# Patient Record
Sex: FEMALE | Race: WHITE | URBAN - METROPOLITAN AREA
[De-identification: names, ages, dates, MRNs, and addresses within clinical notes are randomized per-mention and may not be internally consistent; named-entity substitution may affect disease eponyms.]

---

## 2023-07-03 ENCOUNTER — HOSPITAL ENCOUNTER (EMERGENCY)
Facility: HOSPITAL | Age: 68
Discharge: HOME | End: 2023-07-03
Attending: EMERGENCY MEDICINE
Payer: COMMERCIAL

## 2023-07-03 ENCOUNTER — APPOINTMENT (OUTPATIENT)
Dept: RADIOLOGY | Facility: HOSPITAL | Age: 68
End: 2023-07-03
Payer: COMMERCIAL

## 2023-07-03 VITALS
SYSTOLIC BLOOD PRESSURE: 119 MMHG | TEMPERATURE: 97.5 F | HEART RATE: 79 BPM | RESPIRATION RATE: 16 BRPM | OXYGEN SATURATION: 97 % | DIASTOLIC BLOOD PRESSURE: 71 MMHG

## 2023-07-03 DIAGNOSIS — M54.9 BACK PAIN: ICD-10-CM

## 2023-07-03 DIAGNOSIS — Z87.39 HISTORY OF DEGENERATIVE DISC DISEASE: ICD-10-CM

## 2023-07-03 DIAGNOSIS — R20.2 BILATERAL LEG PARESTHESIA: ICD-10-CM

## 2023-07-03 DIAGNOSIS — R00.2 PALPITATIONS: Primary | ICD-10-CM

## 2023-07-03 LAB
ALBUMIN SERPL BCP-MCNC: 4.3 G/DL (ref 3.5–5)
ALP SERPL-CCNC: 54 U/L (ref 34–104)
ALT SERPL W P-5'-P-CCNC: 17 U/L (ref 7–52)
ANION GAP SERPL CALCULATED.3IONS-SCNC: 7 MMOL/L
AST SERPL W P-5'-P-CCNC: 18 U/L (ref 13–39)
ATRIAL RATE: 72 BPM
BASOPHILS # BLD AUTO: 0.04 THOUSANDS/ÂΜL (ref 0–0.1)
BASOPHILS NFR BLD AUTO: 1 % (ref 0–1)
BILIRUB SERPL-MCNC: 1.52 MG/DL (ref 0.2–1)
BUN SERPL-MCNC: 21 MG/DL (ref 5–25)
CALCIUM SERPL-MCNC: 9.6 MG/DL (ref 8.4–10.2)
CARDIAC TROPONIN I PNL SERPL HS: <2 NG/L
CHLORIDE SERPL-SCNC: 103 MMOL/L (ref 96–108)
CO2 SERPL-SCNC: 26 MMOL/L (ref 21–32)
CREAT SERPL-MCNC: 0.79 MG/DL (ref 0.6–1.3)
EOSINOPHIL # BLD AUTO: 0.09 THOUSAND/ÂΜL (ref 0–0.61)
EOSINOPHIL NFR BLD AUTO: 1 % (ref 0–6)
ERYTHROCYTE [DISTWIDTH] IN BLOOD BY AUTOMATED COUNT: 12.4 % (ref 11.6–15.1)
GFR SERPL CREATININE-BSD FRML MDRD: 78 ML/MIN/1.73SQ M
GLUCOSE SERPL-MCNC: 84 MG/DL (ref 65–140)
HCT VFR BLD AUTO: 43.3 % (ref 34.8–46.1)
HGB BLD-MCNC: 14.3 G/DL (ref 11.5–15.4)
IMM GRANULOCYTES # BLD AUTO: 0.02 THOUSAND/UL (ref 0–0.2)
IMM GRANULOCYTES NFR BLD AUTO: 0 % (ref 0–2)
LYMPHOCYTES # BLD AUTO: 2.57 THOUSANDS/ÂΜL (ref 0.6–4.47)
LYMPHOCYTES NFR BLD AUTO: 37 % (ref 14–44)
MCH RBC QN AUTO: 31.4 PG (ref 26.8–34.3)
MCHC RBC AUTO-ENTMCNC: 33 G/DL (ref 31.4–37.4)
MCV RBC AUTO: 95 FL (ref 82–98)
MONOCYTES # BLD AUTO: 0.63 THOUSAND/ÂΜL (ref 0.17–1.22)
MONOCYTES NFR BLD AUTO: 9 % (ref 4–12)
NEUTROPHILS # BLD AUTO: 3.53 THOUSANDS/ÂΜL (ref 1.85–7.62)
NEUTS SEG NFR BLD AUTO: 52 % (ref 43–75)
NRBC BLD AUTO-RTO: 0 /100 WBCS
P AXIS: 8 DEGREES
PLATELET # BLD AUTO: 210 THOUSANDS/UL (ref 149–390)
PMV BLD AUTO: 10.6 FL (ref 8.9–12.7)
POTASSIUM SERPL-SCNC: 4.4 MMOL/L (ref 3.5–5.3)
PR INTERVAL: 124 MS
PROT SERPL-MCNC: 7.6 G/DL (ref 6.4–8.4)
QRS AXIS: 60 DEGREES
QRSD INTERVAL: 78 MS
QT INTERVAL: 380 MS
QTC INTERVAL: 416 MS
RBC # BLD AUTO: 4.56 MILLION/UL (ref 3.81–5.12)
SODIUM SERPL-SCNC: 136 MMOL/L (ref 135–147)
T WAVE AXIS: 88 DEGREES
VENTRICULAR RATE: 72 BPM
WBC # BLD AUTO: 6.88 THOUSAND/UL (ref 4.31–10.16)

## 2023-07-03 PROCEDURE — 93005 ELECTROCARDIOGRAM TRACING: CPT

## 2023-07-03 PROCEDURE — 84484 ASSAY OF TROPONIN QUANT: CPT | Performed by: EMERGENCY MEDICINE

## 2023-07-03 PROCEDURE — 80053 COMPREHEN METABOLIC PANEL: CPT | Performed by: EMERGENCY MEDICINE

## 2023-07-03 PROCEDURE — 71046 X-RAY EXAM CHEST 2 VIEWS: CPT

## 2023-07-03 PROCEDURE — 93010 ELECTROCARDIOGRAM REPORT: CPT | Performed by: INTERNAL MEDICINE

## 2023-07-03 PROCEDURE — 36415 COLL VENOUS BLD VENIPUNCTURE: CPT

## 2023-07-03 PROCEDURE — 85025 COMPLETE CBC W/AUTO DIFF WBC: CPT | Performed by: EMERGENCY MEDICINE

## 2023-07-03 PROCEDURE — 99285 EMERGENCY DEPT VISIT HI MDM: CPT

## 2023-07-03 NOTE — Clinical Note
Luis Antonio Li was seen and treated in our emergency department on 7/3/2023. Diagnosis:     Catherine German  . She may return on this date: If you have any questions or concerns, please don't hesitate to call.       Victoriano Nur, DO    ______________________________           _______________          _______________  Hospital Representative                              Date                                Time

## 2023-07-03 NOTE — ED PROVIDER NOTES
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 HPI     77 y/o F presents with sensation of palpitations which she states is not new and has been going on for approx 2 years intermittently and states that she has not had a significant amount of time to follow up on her own health. She recently went to a [facility] for a life line screening on 6/15 and was given a report with information for her screening w/ notes of mild carotid atherosclerosis, no detection of AAA, or atrial fibrillation, no otherwise significant PAD, low probability of osteoporosis and Moderate BMI risk w/ BMI 26. She had incidental findings of "possible ST segment changes or Q waves" with an ECG read out. She presents today for further evaluation. She does not currently have PCP. Prior had established care w/ Dr. Azar January but without recent f/u. She is currently asymptomatic. No reports of CP today. No current palpitations, syncope or presyncopal events. She does state that she has had a hx of DJD and does intermittently get LBP as well as B/L LE paresthesias intermittently. Prior to Admission Medications   Prescriptions Last Dose Informant Patient Reported? Taking? Coenzyme Q10 (CO Q 10 PO)   Yes Yes   Sig: Take by mouth   Docosahexaenoic Acid (DHA COMPLETE PO)   Yes Yes   Sig: Take by mouth   OMEGA-3 FATTY ACIDS PO   Yes Yes   Sig: Take by mouth      Facility-Administered Medications: None       No past medical history on file. No past surgical history on file. Family History   Problem Relation Age of Onset   • Diabetes Family    • Hypertension Family    • Stroke Family    • Heart disease Family      I have reviewed and agree with the history as documented.     E-Cigarette/Vaping     E-Cigarette/Vaping Substances Review of Systems   Constitutional: Negative for chills and fever. HENT: Negative for ear pain and sore throat. Eyes: Negative for pain and visual disturbance. Respiratory: Negative for cough and shortness of breath. Cardiovascular: Positive for palpitations. Negative for chest pain. Gastrointestinal: Negative for abdominal pain and vomiting. Genitourinary: Negative for dysuria and hematuria. Musculoskeletal: Positive for back pain. Negative for arthralgias. Skin: Negative for color change and rash. Neurological: Negative for seizures and syncope. All other systems reviewed and are negative. Physical Exam  Physical Exam  Vitals and nursing note reviewed. Constitutional:       General: She is not in acute distress. Appearance: She is well-developed. HENT:      Head: Normocephalic and atraumatic. Eyes:      Conjunctiva/sclera: Conjunctivae normal.   Cardiovascular:      Rate and Rhythm: Normal rate and regular rhythm. Heart sounds: No murmur heard. Pulmonary:      Effort: Pulmonary effort is normal. No respiratory distress. Breath sounds: Normal breath sounds. Abdominal:      Palpations: Abdomen is soft. Tenderness: There is no abdominal tenderness. Musculoskeletal:         General: No swelling. Cervical back: Neck supple. Skin:     General: Skin is warm and dry. Capillary Refill: Capillary refill takes less than 2 seconds. Neurological:      Mental Status: She is alert.    Psychiatric:         Mood and Affect: Mood normal.         Vital Signs  ED Triage Vitals   Temperature Pulse Respirations Blood Pressure SpO2   07/03/23 1110 07/03/23 1106 07/03/23 1106 07/03/23 1108 07/03/23 1106   97.5 °F (36.4 °C) 79 16 119/71 97 %      Temp src Heart Rate Source Patient Position - Orthostatic VS BP Location FiO2 (%)   -- 07/03/23 1106 07/03/23 1106 07/03/23 1106 --    Monitor Sitting Left arm       Pain Score       07/03/23 1106       No Pain Vitals:    07/03/23 1106 07/03/23 1108   BP:  119/71   Pulse: 79    Patient Position - Orthostatic VS: Sitting          Visual Acuity      ED Medications  Medications - No data to display    Diagnostic Studies  Results Reviewed     Procedure Component Value Units Date/Time    HS Troponin 0hr (reflex protocol) [[id number]]  (Normal) Collected: 07/03/23 1113    Lab Status: Final result Specimen: Blood from Arm, Right Updated: 07/03/23 1141     hs TnI 0hr <2 ng/L     Comprehensive metabolic panel [[id number]]  (Abnormal) Collected: 07/03/23 1113    Lab Status: Final result Specimen: Blood from Arm, Right Updated: 07/03/23 1134     Sodium 136 mmol/L      Potassium 4.4 mmol/L      Chloride 103 mmol/L      CO2 26 mmol/L      ANION GAP 7 mmol/L      BUN 21 mg/dL      Creatinine 0.79 mg/dL      Glucose 84 mg/dL      Calcium 9.6 mg/dL      AST 18 U/L      ALT 17 U/L      Alkaline Phosphatase 54 U/L      Total Protein 7.6 g/dL      Albumin 4.3 g/dL      Total Bilirubin 1.52 mg/dL      eGFR 78 ml/min/1.73sq m     Narrative:      [place] guidelines for Chronic Kidney Disease (CKD):   •  Stage 1 with normal or high GFR (GFR > 90 mL/min/1.73 square meters)  •  Stage 2 Mild CKD (GFR = 60-89 mL/min/1.73 square meters)  •  Stage 3A Moderate CKD (GFR = 45-59 mL/min/1.73 square meters)  •  Stage 3B Moderate CKD (GFR = 30-44 mL/min/1.73 square meters)  •  Stage 4 Severe CKD (GFR = 15-29 mL/min/1.73 square meters)  •  Stage 5 End Stage CKD (GFR <15 mL/min/1.73 square meters)  Note: GFR calculation is accurate only with a steady state creatinine    CBC and differential [[id number]] Collected: 07/03/23 1113    Lab Status: Final result Specimen: Blood from Arm, Right Updated: 07/03/23 1119     WBC 6.88 Thousand/uL      RBC 4.56 Million/uL      Hemoglobin 14.3 g/dL      Hematocrit 43.3 %      MCV 95 fL      MCH 31.4 pg      MCHC 33.0 g/dL      RDW 12.4 %      MPV 10.6 fL      Platelets 975 Thousands/uL nRBC 0 /100 WBCs      Neutrophils Relative 52 %      Immat GRANS % 0 %      Lymphocytes Relative 37 %      Monocytes Relative 9 %      Eosinophils Relative 1 %      Basophils Relative 1 %      Neutrophils Absolute 3.53 Thousands/µL      Immature Grans Absolute 0.02 Thousand/uL      Lymphocytes Absolute 2.57 Thousands/µL      Monocytes Absolute 0.63 Thousand/µL      Eosinophils Absolute 0.09 Thousand/µL      Basophils Absolute 0.04 Thousands/µL                  XR chest 2 views   ED Interpretation by Toya Galdamez PA-C (07/03 1227)   No acute disease      Final Result by Vinny De La O MD (07/04 2874)      No acute cardiopulmonary disease. Workstation performed: [id number]                    Procedures  ECG 12 Lead Documentation Only    Date/Time: 7/3/2023 11:08 AM    Performed by: Toya Galdamez PA-C  Authorized by: Toya Galdamez PA-C    ECG reviewed by me, the ED Provider: yes    Patient location:  ED  Previous ECG:     Comparison to cardiac monitor: Yes    Interpretation:     Interpretation: normal    Rate:     ECG rate:  72    ECG rate assessment: normal    Rhythm:     Rhythm: sinus rhythm    Ectopy:     Ectopy: none    QRS:     QRS axis:  Normal  Conduction:     Conduction: normal    ST segments:     ST segments:  Normal  T waves:     T waves: inverted      Inverted:  AVL  Comments:      Self interpreted by me. ED Course        Stable ED course without worsening condition or deterioration. HEART Risk Score    Flowsheet Row Most Recent Value   Heart Score Risk Calculator    History 0 Filed at: 07/03/2023 1222   ECG 1 Filed at: 07/03/2023 1222   Age 2 Filed at: 07/03/2023 1222   Risk Factors 1 Filed at: 07/03/2023 1222   Troponin 0 Filed at: 07/03/2023 1222   HEART Score 4 Filed at: 07/03/2023 1222                        SBIRT 22yo+    Flowsheet Row Most Recent Value   Initial Alcohol Screen: US AUDIT-C     1.  How often do you have a drink containing alcohol? 0 Filed at: 07/03/2023 1108   2. How many drinks containing alcohol do you have on a typical day you are drinking? 0 Filed at: 07/03/2023 1108   3b. FEMALE Any Age, or MALE 65+: How often do you have 4 or more drinks on one occassion? 0 Filed at: 07/03/2023 1108   Audit-C Score 0 Filed at: 07/03/2023 1108   VINCE: How many times in the past year have you. .. Used an illegal drug or used a prescription medication for non-medical reasons? Never Filed at: 07/03/2023 1108                    Medical Decision Making  Pt has upcoming appt w/ cardiology in the OP setting in august to be followed as scheduled. Discussed heart score risk w/o significant findings here in ED. DDX arrhythmia / ACS / PAC  / PVC. Did discuss non specific TWI on EKG and non contiguous and non indicator for significance of finding. Encouraged f/u. May need f/u w/ holter monitor in OP setting. Referred to comp spine. Back pain: chronic illness or injury  Bilateral leg paresthesia: chronic illness or injury  History of degenerative disc disease: chronic illness or injury  Palpitations: chronic illness or injury  Amount and/or Complexity of Data Reviewed  Labs: ordered. Radiology: ordered and independent interpretation performed.           Disposition  Final diagnoses:   Palpitations   History of degenerative disc disease   Back pain   Bilateral leg paresthesia     Time reflects when diagnosis was documented in both MDM as applicable and the Disposition within this note     Time User Action Codes Description Comment    7/3/2023 12:22 PM Sueellen Halon Add [R00.2] Palpitations     7/3/2023 12:27 PM Sueellen Halon Add [Z87.39] History of degenerative disc disease     7/3/2023 12:29 PM Sueellen Halon Add [M54.9] Back pain     7/3/2023 12:29 PM Sueellen Halon Add [R20.2] Bilateral leg paresthesia       ED Disposition     ED Disposition   Discharge    Condition   Stable    Date/Time   Mon Jul 3, 2023 12:22 PM Comment   Misa Baker discharge to home/self care. Follow-up Information     Follow up With Specialties Details Why Contact Info Additional Dez Olmedo MD Cardiology Call today Call to follow-up as scheduled on August 21, 2023 [address] [place] [phone]  [facility] Emergency Department Emergency Medicine Go to  If symptoms worsen sudden onset CP / SOB. [address] [phone]  [address] [facility] Emergency Department, [address] [facility], [place], [address] [facility],[address]    Laura Donohue MD Family Medicine Call today Call for routine follow-up in interim pending cardiology work-up [place] [phone]       [address] Program Physical Therapy Call today Call for follow up of back pain.  [phone]          Discharge Medication List as of 7/3/2023 12:29 PM      CONTINUE these medications which have NOT CHANGED    Details   Coenzyme Q10 (CO Q 10 PO) Take by mouth, Historical Med      Docosahexaenoic Acid (DHA COMPLETE PO) Take by mouth, Historical Med      OMEGA-3 FATTY ACIDS PO Take by mouth, Historical Med                 PDMP Review     None          ED Provider  Electronically Signed by           Ting Taylor PA-C  07/05/23 5718

## 2023-07-03 NOTE — Clinical Note
Citlali Gamboa was seen and treated in our emergency department on 7/3/2023. Diagnosis:     Seferino Park  is off the rest of the shift today. She may return on this date: If you have any questions or concerns, please don't hesitate to call.       Maria T Heart PA-C    ______________________________           _______________          _______________  Hospital Representative                              Date                                Time

## 2023-07-03 NOTE — Clinical Note
Jade Lipoma was seen and treated in our emergency department on 7/3/2023. Diagnosis:     Cirilo Haas  is off the rest of the shift today. She may return on this date: If you have any questions or concerns, please don't hesitate to call.       Samir Padilla PA-C    ______________________________           _______________          _______________  Hospital Representative                              Date                                Time

## 2023-07-05 ENCOUNTER — TELEPHONE (OUTPATIENT)
Dept: PHYSICAL THERAPY | Facility: OTHER | Age: 68
End: 2023-07-05

## 2023-07-05 NOTE — TELEPHONE ENCOUNTER
Call placed to the patient per [facility] referral.    Spoke with the patient. She is not interested in triage at this time. She has too much vacation planned and upcoming.  Will call us back when she has time    Comp spine closed

## 2023-08-21 ENCOUNTER — APPOINTMENT (OUTPATIENT)
Dept: LAB | Facility: HOSPITAL | Age: 68
End: 2023-08-21
Payer: COMMERCIAL

## 2023-08-21 ENCOUNTER — OFFICE VISIT (OUTPATIENT)
Dept: FAMILY MEDICINE CLINIC | Facility: HOSPITAL | Age: 68
End: 2023-08-21
Payer: COMMERCIAL

## 2023-08-21 VITALS
SYSTOLIC BLOOD PRESSURE: 142 MMHG | BODY MASS INDEX: 27.23 KG/M2 | WEIGHT: 148 LBS | DIASTOLIC BLOOD PRESSURE: 80 MMHG | HEIGHT: 62 IN | OXYGEN SATURATION: 97 % | HEART RATE: 72 BPM

## 2023-08-21 DIAGNOSIS — Z82.49 FAMILY HISTORY OF MYOCARDIAL INFARCTION: ICD-10-CM

## 2023-08-21 DIAGNOSIS — Z11.59 NEED FOR HEPATITIS C SCREENING TEST: ICD-10-CM

## 2023-08-21 DIAGNOSIS — Z13.1 SCREENING FOR DIABETES MELLITUS: ICD-10-CM

## 2023-08-21 DIAGNOSIS — Z13.220 SCREENING FOR HYPERLIPIDEMIA: ICD-10-CM

## 2023-08-21 DIAGNOSIS — Z83.3 FAMILY HISTORY OF DIABETES MELLITUS IN FATHER: ICD-10-CM

## 2023-08-21 DIAGNOSIS — Z13.0 SCREENING FOR IRON DEFICIENCY ANEMIA: ICD-10-CM

## 2023-08-21 DIAGNOSIS — Z82.0 FAMILY HISTORY OF ALZHEIMER'S DISEASE: ICD-10-CM

## 2023-08-21 DIAGNOSIS — Z12.39 ENCOUNTER FOR SCREENING FOR MALIGNANT NEOPLASM OF BREAST, UNSPECIFIED SCREENING MODALITY: Primary | ICD-10-CM

## 2023-08-21 DIAGNOSIS — Z13.220 SCREENING FOR LIPOID DISORDERS: ICD-10-CM

## 2023-08-21 DIAGNOSIS — Z13.29 SCREENING FOR THYROID DISORDER: ICD-10-CM

## 2023-08-21 DIAGNOSIS — Z86.39 HISTORY OF VITAMIN D DEFICIENCY: ICD-10-CM

## 2023-08-21 LAB
25(OH)D3 SERPL-MCNC: 94.7 NG/ML (ref 30–100)
ALBUMIN SERPL BCP-MCNC: 4.2 G/DL (ref 3.5–5)
ALP SERPL-CCNC: 63 U/L (ref 46–116)
ALT SERPL W P-5'-P-CCNC: 29 U/L (ref 12–78)
ANION GAP SERPL CALCULATED.3IONS-SCNC: 4 MMOL/L
AST SERPL W P-5'-P-CCNC: 18 U/L (ref 5–45)
BILIRUB SERPL-MCNC: 1.27 MG/DL (ref 0.2–1)
BUN SERPL-MCNC: 21 MG/DL (ref 5–25)
CALCIUM SERPL-MCNC: 9.4 MG/DL (ref 8.3–10.1)
CHLORIDE SERPL-SCNC: 107 MMOL/L (ref 96–108)
CHOLEST SERPL-MCNC: 292 MG/DL
CO2 SERPL-SCNC: 27 MMOL/L (ref 21–32)
CREAT SERPL-MCNC: 0.92 MG/DL (ref 0.6–1.3)
CRP SERPL HS-MCNC: 1.53 MG/L
EST. AVERAGE GLUCOSE BLD GHB EST-MCNC: 117 MG/DL
FERRITIN SERPL-MCNC: 87 NG/ML (ref 11–307)
GFR SERPL CREATININE-BSD FRML MDRD: 65 ML/MIN/1.73SQ M
GLUCOSE P FAST SERPL-MCNC: 90 MG/DL (ref 65–99)
HBA1C MFR BLD: 5.7 %
HCV AB SER QL: NORMAL
HCYS SERPL-SCNC: 9.3 UMOL/L (ref 3.7–11.2)
HDLC SERPL-MCNC: 77 MG/DL
INSULIN SERPL-ACNC: 3.83 UIU/ML (ref 1.9–23)
LDLC SERPL CALC-MCNC: 200 MG/DL (ref 0–100)
LDLC SERPL DIRECT ASSAY-MCNC: 189 MG/DL (ref 0–100)
NONHDLC SERPL-MCNC: 215 MG/DL
POTASSIUM SERPL-SCNC: 3.9 MMOL/L (ref 3.5–5.3)
PROT SERPL-MCNC: 8.8 G/DL (ref 6.4–8.4)
SODIUM SERPL-SCNC: 138 MMOL/L (ref 135–147)
TRIGL SERPL-MCNC: 73 MG/DL
TSH SERPL DL<=0.05 MIU/L-ACNC: 1.08 UIU/ML (ref 0.45–4.5)

## 2023-08-21 PROCEDURE — 80053 COMPREHEN METABOLIC PANEL: CPT

## 2023-08-21 PROCEDURE — 82728 ASSAY OF FERRITIN: CPT

## 2023-08-21 PROCEDURE — 99205 OFFICE O/P NEW HI 60 MIN: CPT | Performed by: STUDENT IN AN ORGANIZED HEALTH CARE EDUCATION/TRAINING PROGRAM

## 2023-08-21 PROCEDURE — 83721 ASSAY OF BLOOD LIPOPROTEIN: CPT

## 2023-08-21 PROCEDURE — 83695 ASSAY OF LIPOPROTEIN(A): CPT

## 2023-08-21 PROCEDURE — 83525 ASSAY OF INSULIN: CPT

## 2023-08-21 PROCEDURE — 84443 ASSAY THYROID STIM HORMONE: CPT

## 2023-08-21 PROCEDURE — 83090 ASSAY OF HOMOCYSTEINE: CPT

## 2023-08-21 PROCEDURE — 83036 HEMOGLOBIN GLYCOSYLATED A1C: CPT

## 2023-08-21 PROCEDURE — 82306 VITAMIN D 25 HYDROXY: CPT

## 2023-08-21 PROCEDURE — 86803 HEPATITIS C AB TEST: CPT

## 2023-08-21 PROCEDURE — 36415 COLL VENOUS BLD VENIPUNCTURE: CPT

## 2023-08-21 PROCEDURE — 80061 LIPID PANEL: CPT

## 2023-08-21 PROCEDURE — 86141 C-REACTIVE PROTEIN HS: CPT

## 2023-08-21 NOTE — PATIENT INSTRUCTIONS
[address] [facility] Prevention for Older Adults   WHAT YOU NEED TO KNOW:   What is fall prevention? Fall prevention includes ways to make your home and other areas safer. It also includes ways you can move more carefully to prevent a fall. What increases my risk for falls? As you age, your muscles weaken and your risk for falls increases. Your risk also increases if you take medicines that make you sleepy or dizzy. You may also be at risk if you have vision or joint problems, have low blood pressure, or are not active. How can I help protect myself from falls? Falls are a common cause of injury for older adults. Do the following to help protect yourself:  Stay active. Exercise can help strengthen your muscles and improve your balance. Your healthcare provider may recommend water aerobics, walking, or Samy Chi. He or she may also recommend physical therapy to improve your coordination. Never start an exercise program without asking your healthcare provider first.            Wear shoes that fit well and have soles that . Wear shoes both inside and outside. Use slippers with good . Avoid shoes with high heels. Use assistive devices as directed. Your healthcare provider may suggest that you use a cane or walker to help you keep your balance. You may need to have grab bars put in your bathroom near the toilet or in the shower. Stand or sit up slowly. This may help you keep your balance and prevent falls. Wear a personal alarm. This is a device that allows you to call 911 if you need help. Ask your healthcare provider for more information. Manage your medical conditions. Keep all appointments with your healthcare providers. Visit your eye doctor as directed. How can I make my home safer? Add items to prevent falls in the bathroom. Put nonslip strips on your bath or shower floor to prevent you from slipping.  Use a bath mat if you do not have carpet in the bathroom. This will prevent you from falling when you step out of the bath or shower. Use a shower seat so you do not need to stand while you shower. Sit on the toilet or a chair in your bathroom to dry yourself and put on clothing. This will prevent you from losing your balance from drying or dressing yourself while you are standing. Keep paths clear. Remove books, shoes, and other objects from walkways and stairs. Place cords for telephones and lamps out of the way so that you do not need to walk over them. Tape them down if you cannot move them. Remove small rugs. If you cannot remove a rug, secure it with double-sided tape. This will prevent you from tripping. Install bright lights in your home. Use night lights to help light paths to the bathroom or kitchen. Always turn on the light before you start walking. Keep items you use often on shelves within reach. Do not use a step stool to help you reach an item. Paint or place reflective tape on the edges of your stairs. This will help you see the stairs better. Call your local emergency number [phone] in the [address]) or have someone call if:   You have fallen and are unconscious. You have fallen and cannot move part of your body. When should I call my doctor? You have fallen and have pain or a headache. You have questions or concerns about your condition or care. CARE AGREEMENT:   You have the right to help plan your care. Learn about your health condition and how it may be treated. Discuss treatment options with your healthcare providers to decide what care you want to receive. You always have the right to refuse treatment. The above information is an  only. It is not intended as medical advice for individual conditions or treatments. Talk to your doctor, nurse or pharmacist before following any medical regimen to see if it is safe and effective for you.   © Copyright Mary Lerma 2022 Information is for End User's use only and may not be sold, redistributed or otherwise used for commercial purposes.
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
## 2023-08-22 LAB — LPA SERPL-SCNC: 105.7 NMOL/L

## 2023-08-25 ENCOUNTER — HOSPITAL ENCOUNTER (OUTPATIENT)
Dept: ULTRASOUND IMAGING | Facility: HOSPITAL | Age: 68
End: 2023-08-25
Attending: INTERNAL MEDICINE
Payer: COMMERCIAL

## 2023-08-25 ENCOUNTER — APPOINTMENT (OUTPATIENT)
Dept: LAB | Facility: HOSPITAL | Age: 68
End: 2023-08-25
Payer: COMMERCIAL

## 2023-08-25 ENCOUNTER — OFFICE VISIT (OUTPATIENT)
Dept: CARDIOLOGY CLINIC | Facility: CLINIC | Age: 68
End: 2023-08-25
Payer: COMMERCIAL

## 2023-08-25 VITALS
DIASTOLIC BLOOD PRESSURE: 80 MMHG | HEIGHT: 62 IN | WEIGHT: 148.8 LBS | BODY MASS INDEX: 27.38 KG/M2 | SYSTOLIC BLOOD PRESSURE: 122 MMHG | HEART RATE: 70 BPM

## 2023-08-25 DIAGNOSIS — R00.2 PALPITATIONS: Primary | ICD-10-CM

## 2023-08-25 DIAGNOSIS — R17 ELEVATED BILIRUBIN: ICD-10-CM

## 2023-08-25 DIAGNOSIS — R17 RECURRENT JAUNDICE OF PREGNANCY, ANTEPARTUM: ICD-10-CM

## 2023-08-25 DIAGNOSIS — E78.2 MIXED HYPERLIPIDEMIA: ICD-10-CM

## 2023-08-25 DIAGNOSIS — E78.41 ELEVATED LIPOPROTEIN(A): ICD-10-CM

## 2023-08-25 DIAGNOSIS — O26.619 RECURRENT JAUNDICE OF PREGNANCY, ANTEPARTUM: ICD-10-CM

## 2023-08-25 PROBLEM — E78.5 HYPERLIPIDEMIA: Status: ACTIVE | Noted: 2023-08-25

## 2023-08-25 LAB
BILIRUB DIRECT SERPL-MCNC: 0.22 MG/DL (ref 0–0.2)
LDH SERPL-CCNC: 185 U/L (ref 140–271)

## 2023-08-25 PROCEDURE — 83010 ASSAY OF HAPTOGLOBIN QUANT: CPT

## 2023-08-25 PROCEDURE — 76705 ECHO EXAM OF ABDOMEN: CPT

## 2023-08-25 PROCEDURE — 83615 LACTATE (LD) (LDH) ENZYME: CPT

## 2023-08-25 PROCEDURE — 99204 OFFICE O/P NEW MOD 45 MIN: CPT | Performed by: INTERNAL MEDICINE

## 2023-08-25 PROCEDURE — 36415 COLL VENOUS BLD VENIPUNCTURE: CPT

## 2023-08-25 PROCEDURE — 82248 BILIRUBIN DIRECT: CPT

## 2023-08-25 NOTE — PROGRESS NOTES
[facility]    Name:Misa Baker   DOS: 8/25/2023     Chief Complaint:   Chief Complaint   Patient presents with   • Consult     Referred by ER    • Palpitations     Brought on by caffeine        HISTORY OF PRESENT ILLNESS:    HPI:  Brice Ackerman is a 78 y.o. female. She  has a past medical history of Obesity and Pneumonia. She is presents to establish care with a cardiologist for evaluation of palpitations and to discuss risk factor modification given strong family history of heart disease. The patient reports that she has experienced palpitations for many years, particularly associated with caffeine intake. States that these episodes are characterized as a subjective sensation of her heart racing without subjective sensation of skipped beats or irregularity. Episodes occur typically at rest, lasting minutes at a time and are self-limiting. No prior cardiac work-up thus far. Patient has purchased a cardia mobile device for use at home and does check her rhythm. States that the device has never detected atrial fibrillation. She was seen in the emergency department at our [address] on 7/3/2023 for evaluation of the symptoms. She has no active current cardiopulmonary complaints, specifically denying chest pain, shortness of breath, dizziness, orthopnea, edema, syncope. She reports a strong family history of heart disease. Including her father who has high burden of PVCs. Mother had Alzheimer's and a stroke with microvascular coronary disease. Sister has unspecified heart disease and takes medication for it. Multiple other relatives with early MI and early CAD. There is also a history of high cholesterol within the family. ROS    ROS: Pertinent positives and negatives as described in History of Present Illness. Remainder of a 14 point review of systems was negative.      No Known Allergies     Current Outpatient Medications on File Prior to Visit Medication Sig Dispense Refill   • [DISCONTINUED] Coenzyme Q10 (CO Q 10 PO) Take by mouth     • [DISCONTINUED] Docosahexaenoic Acid (DHA COMPLETE PO) Take by mouth     • [DISCONTINUED] OMEGA-3 FATTY ACIDS PO Take by mouth       No current facility-administered medications on file prior to visit. Past Medical History:   Diagnosis Date   • Obesity    • Pneumonia        Past Surgical History:   Procedure Laterality Date   • TUBAL LIGATION         Family History   Problem Relation Age of Onset   • Diabetes Family    • Hypertension Family    • Stroke Family    • Heart disease Family    • Dementia Mother          sibling had ALZ. • Hypertension Mother    • Stroke Mother         had small vessel heart disease diagnosed about 6 years ago. • Hypertension Father    • Heart disease Father         recently diagnosed post covid? low and missing heart beats   • Diabetes Father         actually prediabetes and it managed with only diet and exercise now. • Prostate cancer Father         radiation at approx age 76   • Hearing loss Father    • Heart disease Maternal Grandfather          at 76 fatal heart attack, but had several from age 61+   • Stroke Maternal Grandmother         Suffered a stroke at approx age 78. lived 13 yrs w/parents. could not walk, talk, toilet but still seemed to have her faculties   • Diabetes Maternal Grandmother    • Heart disease Paternal Grandfather          at 80. death cert bronchopneumonia and myocardial infarction   • Cancer Paternal Grandmother          age 61, i think stomach cancer   • Mental illness Paternal Aunt         paranoid schizophrenia for decades.  still alive at 80.  on lithium and others   • Schizophrenia Paternal Aunt    • Dementia Maternal Aunt    • Dementia Maternal Uncle    • Dementia Maternal Uncle    • Heart disease Sister         at approx 61 on meds for palpitations   • Prostate cancer Brother         recently diagnosed and surgery at age 59   • Cancer Paternal Aunt          age 61, NOTE: half sistercancer, think stomach but not sure       Social History     Socioeconomic History   • Marital status: /Civil Union     Spouse name: Not on file   • Number of children: Not on file   • Years of education: Not on file   • Highest education level: Not on file   Occupational History   • Not on file   Tobacco Use   • Smoking status: Never   • Smokeless tobacco: Never   Vaping Use   • Vaping Use: Never used   Substance and Sexual Activity   • Alcohol use: Never   • Drug use: Never   • Sexual activity: Yes     Birth control/protection: Post-menopausal   Other Topics Concern   • Not on file   Social History Narrative   • Not on file     Social Determinants of Health     Financial Resource Strain: Not on file   Food Insecurity: Not on file   Transportation Needs: Not on file   Physical Activity: Not on file   Stress: Not on file   Social Connections: Not on file   Intimate Partner Violence: Not on file   Housing Stability: Not on file       OBJECTIVE:    /80 (BP Location: Left arm, Patient Position: Sitting, Cuff Size: Standard)   Pulse 70   Ht 5' 2" (1.575 m)   Wt 67.5 kg (148 lb 12.8 oz)   BMI 27.22 kg/m²      BP Readings from Last 3 Encounters:   23 122/80   23 142/80   23 119/71       Wt Readings from Last 3 Encounters:   23 67.5 kg (148 lb 12.8 oz)   23 67.1 kg (148 lb)   23 63.5 kg (140 lb)         Physical Exam  Vitals reviewed. Constitutional:       General: She is not in acute distress. Appearance: Normal appearance. She is not diaphoretic. HENT:      Head: Normocephalic and atraumatic. Eyes:      Conjunctiva/sclera: Conjunctivae normal.   Neck:      Vascular: No carotid bruit or JVD. Cardiovascular:      Rate and Rhythm: Normal rate and regular rhythm. Pulses: Normal pulses. Heart sounds: Normal heart sounds. No murmur heard. No friction rub. No gallop.    Pulmonary:      Effort: Pulmonary effort is normal.      Breath sounds: Normal breath sounds. No wheezing, rhonchi or rales. Abdominal:      General: Abdomen is flat. Bowel sounds are normal. There is no distension. Palpations: Abdomen is soft. There is no hepatomegaly. Tenderness: There is no abdominal tenderness. There is no rebound. Musculoskeletal:      Right lower leg: No edema. Left lower leg: No edema. Skin:     General: Skin is warm and dry. Neurological:      General: No focal deficit present. Mental Status: She is alert and oriented to person, place, and time. Psychiatric:         Mood and Affect: Mood normal.         Behavior: Behavior normal.                                                       Cardiac testing:   EKG reviewed personally as performed in the ED on 7/3/23. My read: Normal sinus rhythm. Early repolarization. Low voltage. LABS:  Lab Results   Component Value Date    BUN 21 08/21/2023    CREATININE 0.92 08/21/2023    CALCIUM 9.4 08/21/2023    K 3.9 08/21/2023    CO2 27 08/21/2023     08/21/2023    ALKPHOS 63 08/21/2023    AST 18 08/21/2023    ALT 29 08/21/2023        Lab Results   Component Value Date    WBC 6.88 07/03/2023    HGB 14.3 07/03/2023    HCT 43.3 07/03/2023    MCV 95 07/03/2023     07/03/2023       Lab Results   Component Value Date    HDL 77 08/21/2023    LDLCALC 200 (H) 08/21/2023    TRIG 73 08/21/2023       Lab Results   Component Value Date    HGBA1C 5.7 (H) 08/21/2023         ASSESSMENT/PLAN:  Diagnoses and all orders for this visit:    Palpitations  -     AMB extended holter monitor; Future    Mixed hyperlipidemia  -     CT coronary calcium score; Future  -     Lipid Panel with Direct LDL reflex; Future  -     Lipid Panel with Direct LDL reflex    Elevated lipoprotein(a)  -     CT coronary calcium score; Future    Elevated bilirubin  -     Bilirubin, direct; Future  -     US right upper quadrant; Future  -     LD,Blood;  Future  -     Haptoglobin; Future  -     Bilirubin, direct  -     LD,Blood  -     Haptoglobin      69-year-old female presenting for evaluation of palpitations and to discuss cardiac risk factor modification. She has elevated cholesterol with an elevated total LDL, and although her calculated ASCVD risk score is 6% I suspect that her overall ASCVD risk is higher given elevated lipoprotein (a) and family history of heart disease suggesting a familial hyperlipidemia picture. In this setting, I have discussed with the patient my recommendation for initiation of lipid-lowering therapy. She prefers to avoid medication at all cost if possible, and prefers additional evaluation. I have recommended CT calcium scoring to further guide medication initiation and assist with risk factor modification. For evaluation of her palpitations, I recommended a 14-day Zio patch XT monitor to exclude underlying arrhythmias given her family history of high PVC burden in her father of unclear etiology. As a separate noncardiac issue, review of the patient's labs demonstrates an elevated total bilirubin on multiple occasions. This has not been evaluated in follow-up, and therefore I have recommended further work-up which will be subsequently followed up with her PCP at a follow-up office visit. This includes a direct bilirubin, haptoglobin, and LDH to exclude hemolysis. The patient also reports a full sensation in her right upper quadrant, and therefore I referred her for a right upper quadrant ultrasound. She will trial diet and exercise for her lipids, and have a repeat lipid panel drawn in 3 months to reassess. Anitha Zhong MD      Portions of the record may have been created with voice recognition software. Occasional wrong word or "sound alike" substitutions may have occurred due to the inherent limitations of voice recognition software.  Please review the chart carefully and recognize, using context, where substitutions/typographical errors may have occurred.

## 2023-08-25 NOTE — PATIENT INSTRUCTIONS
You were seen today in the Cardiology office for evaluation of palpitations and cardiac risk factors. Please have your lab work done. Schedule your liver ultrasound. Thank you for choosing [facility]. Please call our office or use Tour Desk with any questions.

## 2023-08-26 LAB — HAPTOGLOB SERPL-MCNC: 72 MG/DL (ref 37–355)

## 2023-09-12 ENCOUNTER — HOSPITAL ENCOUNTER (OUTPATIENT)
Dept: CT IMAGING | Facility: HOSPITAL | Age: 68
Discharge: HOME | End: 2023-09-12
Attending: INTERNAL MEDICINE
Payer: COMMERCIAL

## 2023-09-12 DIAGNOSIS — E78.2 MIXED HYPERLIPIDEMIA: ICD-10-CM

## 2023-09-12 DIAGNOSIS — E78.41 ELEVATED LIPOPROTEIN(A): ICD-10-CM

## 2023-09-12 PROCEDURE — G1004 CDSM NDSC: HCPCS

## 2023-09-12 PROCEDURE — 75571 CT HRT W/O DYE W/CA TEST: CPT

## 2023-09-19 ENCOUNTER — CLINICAL SUPPORT (OUTPATIENT)
Dept: CARDIOLOGY CLINIC | Facility: CLINIC | Age: 68
End: 2023-09-19
Payer: COMMERCIAL

## 2023-09-19 DIAGNOSIS — R00.2 PALPITATIONS: ICD-10-CM

## 2023-09-19 PROCEDURE — 93248 EXT ECG>7D<15D REV&INTERPJ: CPT | Performed by: INTERNAL MEDICINE

## 2023-10-25 ENCOUNTER — HOSPITAL ENCOUNTER (OUTPATIENT)
Dept: MAMMOGRAPHY | Facility: IMAGING CENTER | Age: 68
Discharge: HOME | End: 2023-10-25
Payer: COMMERCIAL

## 2023-10-25 VITALS — BODY MASS INDEX: 26.13 KG/M2 | WEIGHT: 142 LBS | HEIGHT: 62 IN

## 2023-10-25 DIAGNOSIS — Z12.31 VISIT FOR SCREENING MAMMOGRAM: ICD-10-CM

## 2023-10-25 PROCEDURE — 77063 BREAST TOMOSYNTHESIS BI: CPT

## 2023-10-25 PROCEDURE — 77067 SCR MAMMO BI INCL CAD: CPT

## 2024-03-26 ENCOUNTER — OFFICE VISIT (OUTPATIENT)
Dept: FAMILY MEDICINE CLINIC | Facility: HOSPITAL | Age: 69
End: 2024-03-26
Payer: COMMERCIAL

## 2024-03-26 VITALS
WEIGHT: 143 LBS | SYSTOLIC BLOOD PRESSURE: 106 MMHG | OXYGEN SATURATION: 98 % | BODY MASS INDEX: 26.16 KG/M2 | DIASTOLIC BLOOD PRESSURE: 72 MMHG | HEART RATE: 65 BPM

## 2024-03-26 DIAGNOSIS — E78.2 MIXED HYPERLIPIDEMIA: Primary | ICD-10-CM

## 2024-03-26 DIAGNOSIS — Z13.0 SCREENING FOR DEFICIENCY ANEMIA: ICD-10-CM

## 2024-03-26 DIAGNOSIS — R73.03 PREDIABETES: ICD-10-CM

## 2024-03-26 DIAGNOSIS — K21.9 GASTROESOPHAGEAL REFLUX DISEASE, UNSPECIFIED WHETHER ESOPHAGITIS PRESENT: ICD-10-CM

## 2024-03-26 DIAGNOSIS — E55.9 VITAMIN D DEFICIENCY: ICD-10-CM

## 2024-03-26 DIAGNOSIS — Z13.29 SCREENING FOR THYROID DISORDER: ICD-10-CM

## 2024-03-26 PROBLEM — M72.0 DUPUYTREN'S DISEASE OF PALM: Status: ACTIVE | Noted: 2024-03-26

## 2024-03-26 PROCEDURE — 99213 OFFICE O/P EST LOW 20 MIN: CPT | Performed by: NURSE PRACTITIONER

## 2024-03-26 PROCEDURE — G2211 COMPLEX E/M VISIT ADD ON: HCPCS | Performed by: NURSE PRACTITIONER

## 2024-03-26 NOTE — ASSESSMENT & PLAN NOTE
Latest Reference Range & Units 08/21/23 10:58   Cholesterol See Comment mg/dL 292 (H)   Triglycerides See Comment mg/dL 73   HDL >=50 mg/dL 77   Non-HDL Cholesterol mg/dl 215   LDL Calculated 0 - 100 mg/dL 200 (H)   LDL CHOLESTEROL DIRECT 0 - 100 mg/dl 189 (H)   LIPOPROTEIN (A) <75.0 nmol/L 105.7 (H)   (H): Data is abnormally high    Ca score 9/12/2023: 24  Strong FHx CAD.  Declines statin therapy at this time.  Encouraged vitamin D, magnesium, Omega 3 rich food sources.  Lifestyle modifications  Recheck LP in 6 months.

## 2024-03-26 NOTE — PROGRESS NOTES
"  [facility]   Tana HAM    Assessment/Plan:   1. Mixed hyperlipidemia  Assessment & Plan:   Latest Reference Range & Units 08/21/23 10:58   Cholesterol See Comment mg/dL 292 (H)   Triglycerides See Comment mg/dL 73   HDL >=50 mg/dL 77   Non-HDL Cholesterol mg/dl 215   LDL Calculated 0 - 100 mg/dL 200 (H)   LDL CHOLESTEROL DIRECT 0 - 100 mg/dl 189 (H)   LIPOPROTEIN (A) <75.0 nmol/L 105.7 (H)   (H): Data is abnormally high    Ca score 9/12/2023: 24  Strong FHx CAD.  Declines statin therapy at this time.  Encouraged vitamin D, magnesium, Omega 3 rich food sources.  Lifestyle modifications  Recheck LP in 6 months.       Orders:  -     Lipid panel; Future  -     Lipid panel    2. Screening for thyroid disorder    3. Screening for deficiency anemia  -     CBC and differential; Future  -     CBC and differential    4. Prediabetes  -     Comprehensive metabolic panel; Future  -     Hemoglobin A1C; Future  -     Comprehensive metabolic panel  -     Hemoglobin A1C    5. Vitamin D deficiency  -     Vitamin D 1,25 dihydroxy; Future  -     Vitamin D 1,25 dihydroxy    6. Gastroesophageal reflux disease, unspecified whether esophagitis present  -     Ambulatory Referral to Gastroenterology; Future        Obtain colonoscopy and DXA scan results.   Return in about 3 months (around 6/26/2024).  Patient may call or return to office with any questions or concerns.     ______________________________________________________________________  Subjective:     Patient ID: Chen Carrera is a 68 y.o. female.  Chen Carrera  Chief Complaint   Patient presents with    [facility]    Follow-up       Here for follow up.  Reports inability to lose weight despite \"rucking\" (hiking with weighted backpack) few times weekly since November.  Recalls dieting on and off since she was a teenager.  Reports diet is 95% organic, non-GMO but does struggle with moderation of sweets/snacks at times.  +intermittent fasting eating " 2 meals daily.  Drinks more water during the summer, struggles during the colder seasons.  Menopause in her late 50s    Known HLD/Prediabetes hx; prefers holistic approach.  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  FHx: strong CAD, CVA, HLD    Reports CP/Palpitations after her mother passed away (Chen was her caretaker [id number]), feels it was a somatic response to grief.  Had cardiac work up, Holter monitor SR with SVT x2 occurences [id number] @111-174bpm.  Possible AT with variable block.  Denies PC, dyspnea, palpations, orthopnea, pre-sycopal/syncopal episodes.  Does report occasional GERD like s/s, has not noted if diet related.     Concerns for dementia: mom and mom's siblings had AD.  Personally +ApoE4 gene one copy.     Reports colonoscopy 2023 at [facility], will obtain records.  DXA scan 2021, will obtain records.                 Depression Screening and Follow-up Plan: Patient was screened for depression during today's encounter. They screened negative with a PHQ-2 score of 0.      The following portions of the patient's history were reviewed and updated as appropriate: allergies, current medications, past family history, past medical history, past social history, past surgical history, and problem list.    Review of Systems   Constitutional: Negative.  Negative for activity change, appetite change, chills, fatigue and fever.   HENT: Negative.  Negative for congestion, ear pain, postnasal drip and sinus pain.    Eyes: Negative.    Respiratory: Negative.  Negative for cough and shortness of breath.    Cardiovascular: Negative.  Negative for chest pain and leg swelling.   Gastrointestinal: Negative.  Negative for constipation and diarrhea.        +GERD   Endocrine: Negative.    Genitourinary: Negative.  Negative for dysuria.   Musculoskeletal: Negative.    Skin: Negative.    Allergic/Immunologic: Negative.  Negative for immunocompromised state.   Neurological: Negative.  Negative for  "dizziness and light-headedness.   Hematological: Negative.    Psychiatric/Behavioral:  Positive for sleep disturbance.         10mg THC gummy for sleep which she finds effective.            Objective:      Vitals:    03/26/24 1036   BP: 106/72   Pulse: 65   SpO2: 98%      Physical Exam  Vitals and nursing note reviewed.   Constitutional:       Appearance: Normal appearance.   HENT:      Head: Normocephalic and atraumatic.      Right Ear: Tympanic membrane, ear canal and external ear normal.      Left Ear: Tympanic membrane, ear canal and external ear normal.      Nose: Nose normal.      Mouth/Throat:      Mouth: Mucous membranes are moist.      Pharynx: Oropharynx is clear.   Eyes:      Extraocular Movements: Extraocular movements intact.      Conjunctiva/sclera: Conjunctivae normal.      Pupils: Pupils are equal, round, and reactive to light.   Cardiovascular:      Rate and Rhythm: Normal rate and regular rhythm.      Pulses: Normal pulses.      Heart sounds: Normal heart sounds.   Pulmonary:      Effort: Pulmonary effort is normal.      Breath sounds: Normal breath sounds.   Abdominal:      General: Bowel sounds are normal.      Palpations: Abdomen is soft.   Musculoskeletal:         General: Deformity present. Normal range of motion.      Left hand: Deformity present.      Cervical back: Normal range of motion and neck supple.      Comments: +abnormal fascia thickening in palm of hand without finger contractures present   Skin:     General: Skin is warm and dry.   Neurological:      General: No focal deficit present.      Mental Status: She is alert and oriented to person, place, and time.   Psychiatric:         Mood and Affect: Mood normal.         Behavior: Behavior normal.         Thought Content: Thought content normal.         Judgment: Judgment normal.           Portions of the record may have been created with voice recognition software. Occasional wrong word or \"sound alike\" substitutions may have " occurred due to the inherent limitations of voice recognition software. Please review the chart carefully and recognize, using context, where substitutions/typographical errors may have occurred.

## 2024-04-02 ENCOUNTER — CONSULT (OUTPATIENT)
Dept: GASTROENTEROLOGY | Facility: CLINIC | Age: 69
End: 2024-04-02
Payer: COMMERCIAL

## 2024-04-02 VITALS
BODY MASS INDEX: 25.95 KG/M2 | HEIGHT: 62 IN | WEIGHT: 141 LBS | SYSTOLIC BLOOD PRESSURE: 108 MMHG | DIASTOLIC BLOOD PRESSURE: 62 MMHG

## 2024-04-02 DIAGNOSIS — K21.9 GASTROESOPHAGEAL REFLUX DISEASE, UNSPECIFIED WHETHER ESOPHAGITIS PRESENT: Primary | ICD-10-CM

## 2024-04-02 DIAGNOSIS — Z12.12 SCREENING FOR COLORECTAL CANCER: ICD-10-CM

## 2024-04-02 DIAGNOSIS — Z12.11 SCREENING FOR COLORECTAL CANCER: ICD-10-CM

## 2024-04-02 PROCEDURE — 99203 OFFICE O/P NEW LOW 30 MIN: CPT | Performed by: INTERNAL MEDICINE

## 2024-04-02 NOTE — PROGRESS NOTES
[facility] - Outpatient Consultation  Chen Carrera 68 y.o. female MRN: [id number]  Encounter: [id number]    ASSESSMENT AND PLAN:    1. Gastroesophageal reflux disease, unspecified whether esophagitis present  -     Ambulatory Referral to Gastroenterology    2. Screening for colorectal cancer    The patient is a 68-year-old female who is presenting for evaluation of bubbling in the back of her throat along with lump on her abdomen.    Bubbling.  It does not appear that these symptoms are correlated with reflux disease. We discussed a trial of PPI with the patient, but they wish to defer taking any medications at this time. She is advised to continue with conservative measures, such as avoiding triggers, avoiding late-night meals, and engaging in regular exercise and diet. Additionally, we recommended Tums to see if it provides any relief.    Abdominal lump.  It feels superficial, and in the setting of a lipoma or abdominal wall hernia, evaluation with a CT scan could also strengthen core musculature.     Screening for colorectal cancer.  The patient's last colonoscopy in 2023 revealed no polyps. A recall is recommended in 10 years.      ---    Chief Complaint   Patient presents with   • GERD     Referred by pcp   • lump     Right side rib area       HPI:   Chen Carrera is a 68 y.o. year old female Chen Carrera is a 68-year-old female who is presenting as a consultation from FATOUMATA Apple regarding GERD.    The patient presents with a history of persistent gurgling, characterized by bubbles, occurring approximately 99% of the time without any associated acidity or sour taste. This symptom is non-patterned and can manifest at any time, including during periods of inactivity. She has made efforts to eliminate potential triggers such as coffee from her diet and has discontinued the use of medical marijuana gums for sleep, with no change in symptoms. She denies  experiencing nausea, vomiting, dysphagia, lower abdominal pain, bowel irregularities, diarrhea, constipation, or hematochezia. There is no history of reflux, and she maintains a healthy diet. Despite these efforts, the patient's symptoms persist. She has not undergone an endoscopy and has not sought treatment for her symptoms.     She has a lump on her abdomen, which she first noticed this summer. An ultrasound was performed due to elevated liver enzymes. She is curious if the lump could be contributing to her gurgling. The lump is more noticeable when she stands up. She denies any associated discomfort or trauma to the area. She has not undergone a CT scan.    Her grandmother  from gastric cancer decades ago. Her father and sister had colon polyps.    At the age of 24, she underwent a tubal ligation procedure.    LAB/RADIOLOGY/ENDOSCOPY RESULTS:   The colonoscopy conducted in  did not reveal any polyps.    The ultrasound of the abdomen was normal.    Historical Information   Past Medical History:   Diagnosis Date   • Obesity    • Pneumonia      Past Surgical History:   Procedure Laterality Date   • TUBAL LIGATION       Social History     Substance and Sexual Activity   Alcohol Use Never     Social History     Substance and Sexual Activity   Drug Use Never     Social History     Tobacco Use   Smoking Status Never   Smokeless Tobacco Never     Family History   Problem Relation Age of Onset   • Dementia Mother          sibling had ALZ.   • Hypertension Mother    • Stroke Mother         had small vessel heart disease diagnosed about 6 years ago.   • Colon polyps Father    • Hypertension Father    • Heart disease Father         recently diagnosed post covid? low and missing heart beats   • Diabetes Father         actually prediabetes and it managed with only diet and exercise now.   • Prostate cancer Father 65   • Hearing loss Father    • Colon polyps Sister    • Heart disease Sister         at approx 60 on  "meds for palpitations   • No Known Problems Sister    • No Known Problems Sister    • Prostate cancer Brother 64   • No Known Problems Brother    • No Known Problems Brother    • Stroke Maternal Grandmother         Suffered a stroke at approx age 79. lived 13 yrs w/parents. could not walk, talk, toilet but still seemed to have her faculties   • Diabetes Maternal Grandmother    • Heart disease Maternal Grandfather          at 75 fatal heart attack, but had several from age 60+   • Stomach cancer Paternal Grandmother         Unknown age   • Heart disease Paternal Grandfather          at 80. death cert bronchopneumonia and myocardial infarction   • Dementia Maternal Aunt    • No Known Problems Maternal Aunt    • No Known Problems Maternal Aunt    • No Known Problems Maternal Aunt    • No Known Problems Maternal Aunt    • No Known Problems Maternal Aunt    • Dementia Maternal Uncle    • Dementia Maternal Uncle    • Mental illness Paternal Aunt         paranoid schizophrenia for decades. still alive at 87.  on lithium and others   • Schizophrenia Paternal Aunt    • Stomach cancer Paternal Aunt         Unknown age   • Breast cancer Cousin 53   • No Known Problems Daughter    • Diabetes Family    • Hypertension Family    • Stroke Family    • Heart disease Family    • Colon cancer Neg Hx        Meds/Allergies   No current outpatient medications on file.  No Known Allergies    PHYSICAL EXAM:    Blood pressure 108/62, height 5' 2\" (1.575 m), weight 64 kg (141 lb). Body mass index is 25.79 kg/m².  General Appearance: No apparent distress, cooperative, alert.  Eyes: Anicteric.  Gastrointestinal: Soft, non-tender, non-distended; normal bowel sounds; no masses, no organomegaly.   Rectal: Deferred.  Musculoskeletal: No edema.  Skin: No jaundice.    OTHER LAB RESULTS:   Lab Results   Component Value Date    WBC 6.88 2023    HGB 14.3 2023    MCV 95 2023     2023     Lab Results   Component " "Value Date    K 3.9 08/21/2023     08/21/2023    CO2 27 08/21/2023    BUN 21 08/21/2023    CREATININE 0.92 08/21/2023    GLUF 90 08/21/2023    CALCIUM 9.4 08/21/2023    AST 18 08/21/2023    AST 18 07/03/2023    ALT 29 08/21/2023    ALT 17 07/03/2023    ALKPHOS 63 08/21/2023    ALKPHOS 54 07/03/2023    EGFR 65 08/21/2023     Lab Results   Component Value Date    FERRITIN 87 08/21/2023     No results found for: \"LIPASE\"    OTHER RADIOLOGY RESULTS:   No results found.    Transcribed for Palma Fritz MD, by Marcos Brown on 04/03/24 at 8:56 AM. Powered by Dragon [employer].  "

## 2024-04-03 DIAGNOSIS — Z00.6 ENCOUNTER FOR EXAMINATION FOR NORMAL COMPARISON OR CONTROL IN CLINICAL RESEARCH PROGRAM: ICD-10-CM

## 2024-04-10 ENCOUNTER — APPOINTMENT (OUTPATIENT)
Dept: LAB | Facility: HOSPITAL | Age: 69
End: 2024-04-10

## 2024-04-10 DIAGNOSIS — Z00.6 ENCOUNTER FOR EXAMINATION FOR NORMAL COMPARISON OR CONTROL IN CLINICAL RESEARCH PROGRAM: ICD-10-CM

## 2024-04-10 PROCEDURE — 36415 COLL VENOUS BLD VENIPUNCTURE: CPT

## 2024-05-22 LAB
APOB+LDLR+PCSK9 GENE MUT ANL BLD/T: NOT DETECTED
BRCA1+BRCA2 DEL+DUP + FULL MUT ANL BLD/T: NOT DETECTED
MLH1+MSH2+MSH6+PMS2 GN DEL+DUP+FUL M: NOT DETECTED

## 2024-09-21 ENCOUNTER — HOSPITAL ENCOUNTER (EMERGENCY)
Facility: HOSPITAL | Age: 69
Discharge: HOME | End: 2024-09-21
Attending: EMERGENCY MEDICINE
Payer: COMMERCIAL

## 2024-09-21 VITALS
SYSTOLIC BLOOD PRESSURE: 151 MMHG | DIASTOLIC BLOOD PRESSURE: 71 MMHG | HEART RATE: 82 BPM | TEMPERATURE: 97.5 F | OXYGEN SATURATION: 98 % | RESPIRATION RATE: 20 BRPM

## 2024-09-21 DIAGNOSIS — M79.5 FOREIGN BODY (FB) IN SOFT TISSUE: Primary | ICD-10-CM

## 2024-09-21 PROCEDURE — 10120 INC&RMVL FB SUBQ TISS SMPL: CPT | Performed by: EMERGENCY MEDICINE

## 2024-09-21 PROCEDURE — 99284 EMERGENCY DEPT VISIT MOD MDM: CPT | Performed by: EMERGENCY MEDICINE

## 2024-09-21 PROCEDURE — 90715 TDAP VACCINE 7 YRS/> IM: CPT | Performed by: EMERGENCY MEDICINE

## 2024-09-21 PROCEDURE — 90471 IMMUNIZATION ADMIN: CPT

## 2024-09-21 PROCEDURE — 99282 EMERGENCY DEPT VISIT SF MDM: CPT

## 2024-09-21 RX ORDER — LIDOCAINE HYDROCHLORIDE AND EPINEPHRINE 10; 10 MG/ML; UG/ML
5 INJECTION, SOLUTION INFILTRATION; PERINEURAL ONCE
Status: COMPLETED | OUTPATIENT
Start: 2024-09-21 | End: 2024-09-21

## 2024-09-21 RX ORDER — GINSENG 100 MG
1 CAPSULE ORAL ONCE
Status: COMPLETED | OUTPATIENT
Start: 2024-09-21 | End: 2024-09-21

## 2024-09-21 RX ADMIN — TETANUS TOXOID, REDUCED DIPHTHERIA TOXOID AND ACELLULAR PERTUSSIS VACCINE, ADSORBED 0.5 ML: 5; 2.5; 8; 8; 2.5 SUSPENSION INTRAMUSCULAR at 07:32

## 2024-09-21 RX ADMIN — LIDOCAINE HYDROCHLORIDE,EPINEPHRINE BITARTRATE 5 ML: 10; .01 INJECTION, SOLUTION INFILTRATION; PERINEURAL at 07:31

## 2024-09-21 RX ADMIN — BACITRACIN 1 SMALL APPLICATION: 500 OINTMENT TOPICAL at 07:31

## 2024-09-21 NOTE — ED PROVIDER NOTES
1. Foreign body (FB) in soft tissue      ED Disposition       ED Disposition   Discharge    Condition   Stable    Date/Time   Sat Sep 21, 2024  7:53 AM    Comment   Chen Carrera discharge to home/self care.                   Assessment & Plan       Medical Decision Making  Right arm soft tissue foreign body will anesthetize and attempt removal give tetanus update    Risk  OTC drugs.  Prescription drug management.                     Medications   tetanus-diphtheria-acellular pertussis (BOOSTRIX) IM injection 0.5 mL (0.5 mL Intramuscular Given 9/21/24 0732)   lidocaine-epinephrine (XYLOCAINE/EPINEPHRINE) 1 %-1:100,000 injection 5 mL (5 mL Infiltration Given by Other 9/21/24 0731)   bacitracin topical ointment 1 small application (1 small application Topical Given by Other 9/21/24 0731)       History of Present Illness       69-year-old female working in her garden this past Monday has feeling of a foreign body in the right forearm      History provided by:  Patient  Medical Problem  Location:  Right forearm  Quality:  Foreign body sensation  Severity:  Moderate  Onset quality:  Unable to specify  Duration:  6 days  Timing:  Constant  Progression:  Unchanged  Chronicity:  New  Context:  Foreign body sensation right forearm      Review of Systems   Skin:  Positive for wound.   All other systems reviewed and are negative.          Objective     ED Triage Vitals   Temperature Pulse Blood Pressure Respirations SpO2 Patient Position - Orthostatic VS   09/21/24 0722 09/21/24 0716 09/21/24 0716 09/21/24 0716 09/21/24 0716 09/21/24 0716   97.5 °F (36.4 °C) 82 151/71 20 98 % Sitting      Temp Source Heart Rate Source BP Location FiO2 (%) Pain Score    09/21/24 0722 09/21/24 0716 09/21/24 0716 -- 09/21/24 0716    Oral Monitor Right arm  No Pain        Physical Exam  Vitals and nursing note reviewed.   Constitutional:       General: She is not in acute distress.  HENT:      Head: Normocephalic and atraumatic.      Right Ear:  External ear normal.      Left Ear: External ear normal.   Eyes:      Extraocular Movements: Extraocular movements intact.      Pupils: Pupils are equal, round, and reactive to light.   Cardiovascular:      Rate and Rhythm: Normal rate and regular rhythm.      Pulses: Normal pulses.      Heart sounds: No murmur heard.     No friction rub. No gallop.   Pulmonary:      Effort: Pulmonary effort is normal. No respiratory distress.      Breath sounds: No stridor. No wheezing, rhonchi or rales.   Abdominal:      General: There is no distension.      Tenderness: There is no abdominal tenderness.   Musculoskeletal:      Right lower leg: No edema.      Left lower leg: No edema.   Skin:     General: Skin is warm and dry.      Coloration: Skin is not jaundiced.      Findings: No bruising, erythema or rash.      Comments: 2 cm linear palpable mass felt in the right forearm without any surrounding erythema drainage or sign of infection   Neurological:      General: No focal deficit present.      Mental Status: She is alert and oriented to person, place, and time.      Cranial Nerves: No cranial nerve deficit.   Psychiatric:         Mood and Affect: Mood normal.         Behavior: Behavior normal.         Labs Reviewed - No data to display  No orders to display       Foreign Body - Embedded    Date/Time: 9/21/2024 7:52 AM    Performed by: Tobias Crabtree DO  Authorized by: Tobias Crabtree DO    Patient location:  ED  Location:     Location:  Arm    Arm location:  R arm    Depth:  Subcutaneous    Tendon involvement:  None  Pre-procedure details:     Imaging:  None    Neurovascular status: intact      Preparation: Patient was prepped and draped in usual sterile fashion    Anesthesia (see MAR for exact dosages):     Anesthesia method:  Local infiltration    Local anesthetic:  Lidocaine 1% WITH epi  Procedure details:     Scalpel size:  11    Localization method:  Probed    Dissection of underlying tissues: no      Bloodless  field: yes      Removal mechanism:  Hemostat    Removal Method:  Open    Foreign bodies recovered:  1    Description:  2 cm splinter    Intact foreign body removal: yes    Post-procedure details:     Neurovascular status: intact      Skin closure:  None    Dressing:  Antibiotic ointment    Patient tolerance of procedure:  Tolerated well, no immediate complications      ED Medication and Procedure Management   None     Patient's Medications    No medications on file     No discharge procedures on file.     Tobias Crabtree DO  09/21/24 0754

## 2024-11-05 DIAGNOSIS — Z13.29 SCREENING FOR THYROID DISORDER: ICD-10-CM

## 2024-11-05 DIAGNOSIS — Z13.0 SCREENING FOR DEFICIENCY ANEMIA: ICD-10-CM

## 2024-11-05 DIAGNOSIS — Z82.0 FAMILY HISTORY OF ALZHEIMER DISEASE: ICD-10-CM

## 2024-11-05 DIAGNOSIS — R73.03 PREDIABETES: Primary | ICD-10-CM

## 2024-11-05 DIAGNOSIS — D50.9 IRON DEFICIENCY ANEMIA, UNSPECIFIED IRON DEFICIENCY ANEMIA TYPE: ICD-10-CM

## 2024-11-05 DIAGNOSIS — E78.2 MIXED HYPERLIPIDEMIA: ICD-10-CM

## 2024-11-05 DIAGNOSIS — Z82.3 FAMILY HISTORY OF CEREBROVASCULAR ACCIDENT (CVA): ICD-10-CM

## 2024-11-05 DIAGNOSIS — E55.9 VITAMIN D DEFICIENCY: ICD-10-CM

## 2024-11-05 DIAGNOSIS — E78.00 HYPERCHOLESTEROLEMIA: ICD-10-CM

## 2024-11-05 DIAGNOSIS — Z13.220 SCREENING FOR HYPERLIPIDEMIA: ICD-10-CM

## 2024-11-05 DIAGNOSIS — Z82.49 FAMILY HISTORY OF MI (MYOCARDIAL INFARCTION): ICD-10-CM

## 2024-11-08 DIAGNOSIS — E78.41 ELEVATED LIPOPROTEIN(A): ICD-10-CM

## 2024-11-08 DIAGNOSIS — E78.2 MIXED HYPERLIPIDEMIA: Primary | ICD-10-CM

## 2024-11-08 DIAGNOSIS — Z82.49 FAMILY HISTORY OF EARLY CAD: ICD-10-CM

## 2024-11-13 ENCOUNTER — APPOINTMENT (OUTPATIENT)
Dept: LAB | Facility: HOSPITAL | Age: 69
End: 2024-11-13
Payer: COMMERCIAL

## 2024-11-13 DIAGNOSIS — E78.41 ELEVATED LIPOPROTEIN(A): ICD-10-CM

## 2024-11-13 DIAGNOSIS — E78.2 MIXED HYPERLIPIDEMIA: ICD-10-CM

## 2024-11-13 DIAGNOSIS — Z82.49 FAMILY HISTORY OF EARLY CAD: ICD-10-CM

## 2024-11-13 LAB
25(OH)D3 SERPL-MCNC: 52.5 NG/ML (ref 30–100)
ALBUMIN SERPL BCG-MCNC: 4.2 G/DL (ref 3.5–5)
ALP SERPL-CCNC: 54 U/L (ref 34–104)
ALT SERPL W P-5'-P-CCNC: 15 U/L (ref 7–52)
ANION GAP SERPL CALCULATED.3IONS-SCNC: 8 MMOL/L (ref 4–13)
AST SERPL W P-5'-P-CCNC: 20 U/L (ref 13–39)
BASOPHILS # BLD AUTO: 0.04 THOUSANDS/ÂΜL (ref 0–0.1)
BASOPHILS NFR BLD AUTO: 1 % (ref 0–1)
BILIRUB SERPL-MCNC: 1.27 MG/DL (ref 0.2–1)
BUN SERPL-MCNC: 27 MG/DL (ref 5–25)
CALCIUM SERPL-MCNC: 9.4 MG/DL (ref 8.4–10.2)
CHLORIDE SERPL-SCNC: 99 MMOL/L (ref 96–108)
CHOLEST SERPL-MCNC: 294 MG/DL (ref ?–200)
CO2 SERPL-SCNC: 29 MMOL/L (ref 21–32)
CREAT SERPL-MCNC: 0.92 MG/DL (ref 0.6–1.3)
CRP SERPL HS-MCNC: 1.12 MG/L
EOSINOPHIL # BLD AUTO: 0.1 THOUSAND/ÂΜL (ref 0–0.61)
EOSINOPHIL NFR BLD AUTO: 2 % (ref 0–6)
ERYTHROCYTE [DISTWIDTH] IN BLOOD BY AUTOMATED COUNT: 12.5 % (ref 11.6–15.1)
EST. AVERAGE GLUCOSE BLD GHB EST-MCNC: 120 MG/DL
FERRITIN SERPL-MCNC: 103 NG/ML (ref 11–307)
GFR SERPL CREATININE-BSD FRML MDRD: 64 ML/MIN/1.73SQ M
GLUCOSE P FAST SERPL-MCNC: 91 MG/DL (ref 65–99)
HBA1C MFR BLD: 5.8 %
HCT VFR BLD AUTO: 44.7 % (ref 34.8–46.1)
HCYS SERPL-SCNC: 13 UMOL/L (ref 5–20)
HDLC SERPL-MCNC: 67 MG/DL
HGB BLD-MCNC: 14.7 G/DL (ref 11.5–15.4)
IMM GRANULOCYTES # BLD AUTO: 0.02 THOUSAND/UL (ref 0–0.2)
IMM GRANULOCYTES NFR BLD AUTO: 0 % (ref 0–2)
IRON SATN MFR SERPL: 33 % (ref 15–50)
IRON SERPL-MCNC: 108 UG/DL (ref 50–212)
LDLC SERPL CALC-MCNC: 214 MG/DL (ref 0–100)
LYMPHOCYTES # BLD AUTO: 3.53 THOUSANDS/ÂΜL (ref 0.6–4.47)
LYMPHOCYTES NFR BLD AUTO: 51 % (ref 14–44)
MCH RBC QN AUTO: 31 PG (ref 26.8–34.3)
MCHC RBC AUTO-ENTMCNC: 32.9 G/DL (ref 31.4–37.4)
MCV RBC AUTO: 94 FL (ref 82–98)
MONOCYTES # BLD AUTO: 0.59 THOUSAND/ÂΜL (ref 0.17–1.22)
MONOCYTES NFR BLD AUTO: 9 % (ref 4–12)
NEUTROPHILS # BLD AUTO: 2.48 THOUSANDS/ÂΜL (ref 1.85–7.62)
NEUTS SEG NFR BLD AUTO: 37 % (ref 43–75)
NONHDLC SERPL-MCNC: 227 MG/DL
NRBC BLD AUTO-RTO: 0 /100 WBCS
PLATELET # BLD AUTO: 255 THOUSANDS/UL (ref 149–390)
PMV BLD AUTO: 11.5 FL (ref 8.9–12.7)
POTASSIUM SERPL-SCNC: 4.2 MMOL/L (ref 3.5–5.3)
PROT SERPL-MCNC: 7.9 G/DL (ref 6.4–8.4)
RBC # BLD AUTO: 4.74 MILLION/UL (ref 3.81–5.12)
SODIUM SERPL-SCNC: 136 MMOL/L (ref 135–147)
TIBC SERPL-MCNC: 328 UG/DL (ref 250–450)
TRIGL SERPL-MCNC: 66 MG/DL (ref ?–150)
TSH SERPL DL<=0.05 MIU/L-ACNC: 0.77 UIU/ML (ref 0.45–4.5)
UIBC SERPL-MCNC: 220 UG/DL (ref 155–355)
WBC # BLD AUTO: 6.76 THOUSAND/UL (ref 4.31–10.16)

## 2024-11-13 PROCEDURE — 80053 COMPREHEN METABOLIC PANEL: CPT

## 2024-11-13 PROCEDURE — 82728 ASSAY OF FERRITIN: CPT

## 2024-11-13 PROCEDURE — 84443 ASSAY THYROID STIM HORMONE: CPT

## 2024-11-13 PROCEDURE — 83550 IRON BINDING TEST: CPT

## 2024-11-13 PROCEDURE — 36415 COLL VENOUS BLD VENIPUNCTURE: CPT

## 2024-11-13 PROCEDURE — 80061 LIPID PANEL: CPT

## 2024-11-13 PROCEDURE — 83540 ASSAY OF IRON: CPT

## 2024-11-13 PROCEDURE — 82306 VITAMIN D 25 HYDROXY: CPT

## 2024-11-13 PROCEDURE — 85025 COMPLETE CBC W/AUTO DIFF WBC: CPT

## 2024-11-13 PROCEDURE — 82652 VIT D 1 25-DIHYDROXY: CPT

## 2024-11-13 PROCEDURE — 83090 ASSAY OF HOMOCYSTEINE: CPT

## 2024-11-13 PROCEDURE — 83036 HEMOGLOBIN GLYCOSYLATED A1C: CPT

## 2024-11-13 PROCEDURE — 86141 C-REACTIVE PROTEIN HS: CPT

## 2024-11-14 ENCOUNTER — RESULTS FOLLOW-UP (OUTPATIENT)
Dept: FAMILY MEDICINE CLINIC | Facility: HOSPITAL | Age: 69
End: 2024-11-14

## 2024-11-14 LAB — 1,25(OH)2D SERPL-MCNC: 49.7 PG/ML (ref 5–200)

## 2024-11-15 ENCOUNTER — OFFICE VISIT (OUTPATIENT)
Dept: FAMILY MEDICINE CLINIC | Facility: HOSPITAL | Age: 69
End: 2024-11-15
Payer: COMMERCIAL

## 2024-11-15 VITALS
BODY MASS INDEX: 25.43 KG/M2 | HEIGHT: 62 IN | SYSTOLIC BLOOD PRESSURE: 120 MMHG | WEIGHT: 138.2 LBS | DIASTOLIC BLOOD PRESSURE: 86 MMHG | HEART RATE: 72 BPM | OXYGEN SATURATION: 98 %

## 2024-11-15 DIAGNOSIS — Z00.00 MEDICARE ANNUAL WELLNESS VISIT, SUBSEQUENT: Primary | ICD-10-CM

## 2024-11-15 DIAGNOSIS — E78.00 PURE HYPERCHOLESTEROLEMIA: ICD-10-CM

## 2024-11-15 DIAGNOSIS — Z12.31 ENCOUNTER FOR SCREENING MAMMOGRAM FOR BREAST CANCER: ICD-10-CM

## 2024-11-15 DIAGNOSIS — R73.03 PREDIABETES: ICD-10-CM

## 2024-11-15 PROBLEM — R00.2 PALPITATIONS: Status: RESOLVED | Noted: 2023-08-25 | Resolved: 2024-11-15

## 2024-11-15 PROCEDURE — G0439 PPPS, SUBSEQ VISIT: HCPCS | Performed by: NURSE PRACTITIONER

## 2024-11-15 RX ORDER — BLOOD SUGAR DIAGNOSTIC
STRIP MISCELLANEOUS
Qty: 100 EACH | Refills: 3 | Status: SHIPPED | OUTPATIENT
Start: 2024-11-15

## 2024-11-15 RX ORDER — BLOOD-GLUCOSE METER
KIT MISCELLANEOUS
Qty: 1 KIT | Refills: 0 | Status: SHIPPED | OUTPATIENT
Start: 2024-11-15

## 2024-11-15 RX ORDER — LANCETS 33 GAUGE
EACH MISCELLANEOUS
Qty: 100 EACH | Refills: 3 | Status: SHIPPED | OUTPATIENT
Start: 2024-11-15

## 2024-11-15 NOTE — ASSESSMENT & PLAN NOTE
Latest Reference Range & Units 11/13/24 10:04   Cholesterol See Comment mg/dL 294 (H)   Triglycerides See Comment mg/dL 66   HDL >=50 mg/dL 67   Non-HDL Cholesterol mg/dl 227   LDL Calculated 0 - 100 mg/dL 214 (H)   (H): Data is abnormally high    History of hypercholesterolemia with elevated LDL.  Coronary calcium score 24 in 2023.  Discussed options declines statin therapy.  Discussed optimizing diet and nutrition.

## 2024-11-15 NOTE — ASSESSMENT & PLAN NOTE
Latest Reference Range & Units 11/13/24 10:04   Hemoglobin A1C Normal 4.0-5.6%; PreDiabetic 5.7-6.4%; Diabetic >=6.5%; Glycemic control for adults with diabetes <7.0% % 5.8 (H)   eAG, EST AVG Glucose mg/dl 120   (H): Data is abnormally high    Consistently prediabetic.  Discussed lifestyle interventions including anti-inflammatory diet, green Mediterranean diet.  Remains physically active.  Has been buying her own glucometer but wonders if she can get it through insurance?  Orders:    Blood Glucose Monitoring Suppl (OneTouch Verio Reflect) w/Device KIT; Check blood sugars once daily. Please substitute with appropriate alternative as covered by patient's insurance. Dx: E11.65    glucose blood (OneTouch Verio) test strip; Check blood sugars once daily. Please substitute with appropriate alternative as covered by patient's insurance. Dx: E11.65    OneTouch Delica Lancets 33G MISC; Check blood sugars once daily. Please substitute with appropriate alternative as covered by patient's insurance. Dx: E11.65

## 2024-11-15 NOTE — PROGRESS NOTES
Name: Chen Carrera      : 1955      MRN: [id number]  Encounter Provider: FATOUMATA Zavala  Encounter Date: 11/15/2024   Encounter department: [facility] PRIMARY CARE [address]    Assessment & Plan  Medicare annual wellness visit, subsequent         Pure hypercholesterolemia   Latest Reference Range & Units 24 10:04   Cholesterol See Comment mg/dL 294 (H)   Triglycerides See Comment mg/dL 66   HDL >=50 mg/dL 67   Non-HDL Cholesterol mg/dl 227   LDL Calculated 0 - 100 mg/dL 214 (H)   (H): Data is abnormally high    History of hypercholesterolemia with elevated LDL.  Coronary calcium score 24 in .  Discussed options declines statin therapy.  Discussed optimizing diet and nutrition.  Prediabetes   Latest Reference Range & Units 24 10:04   Hemoglobin A1C Normal 4.0-5.6%; PreDiabetic 5.7-6.4%; Diabetic >=6.5%; Glycemic control for adults with diabetes <7.0% % 5.8 (H)   eAG, EST AVG Glucose mg/dl 120   (H): Data is abnormally high    Consistently prediabetic.  Discussed lifestyle interventions including anti-inflammatory diet, green Mediterranean diet.  Remains physically active.  Has been buying her own glucometer but wonders if she can get it through insurance?  Orders:    Blood Glucose Monitoring Suppl (OneTouch Verio Reflect) w/Device KIT; Check blood sugars once daily. Please substitute with appropriate alternative as covered by patient's insurance. Dx: E11.65    glucose blood (OneTouch Verio) test strip; Check blood sugars once daily. Please substitute with appropriate alternative as covered by patient's insurance. Dx: E11.65    OneTouch Delica Lancets 33G MISC; Check blood sugars once daily. Please substitute with appropriate alternative as covered by patient's insurance. Dx: E11.65    Encounter for screening mammogram for breast cancer    Orders:    Mammo screening bilateral w 3d and cad; Future       Preventive health issues were discussed with patient, and age  appropriate screening tests were ordered as noted in patient's After Visit Summary. Personalized health advice and appropriate referrals for health education or preventive services given if needed, as noted in patient's After Visit Summary.    History of Present Illness     Here for AWV.  Discussed recent blood work results  Cut out coffee and palpitations gone   Hx abnormal PAP with colposcopy in the past.  Needs updated PAP  Wants diabetic testing supplies for prediabetes.          Patient Care Team:  FATOUMATA Apple as PCP - General (Family Medicine)    Review of Systems   Constitutional: Negative.  Negative for activity change, appetite change, chills, fatigue, fever and unexpected weight change.   HENT: Negative.  Negative for congestion, ear pain, postnasal drip and sinus pain.    Eyes: Negative.    Respiratory: Negative.  Negative for cough, chest tightness and shortness of breath.    Cardiovascular: Negative.  Negative for chest pain, palpitations and leg swelling.   Gastrointestinal: Negative.  Negative for constipation and diarrhea.   Endocrine: Negative.    Genitourinary: Negative.  Negative for difficulty urinating, dysuria, vaginal bleeding and vaginal discharge.   Musculoskeletal: Negative.    Skin: Negative.    Allergic/Immunologic: Negative.  Negative for immunocompromised state.   Neurological: Negative.  Negative for dizziness and light-headedness.   Hematological: Negative.    Psychiatric/Behavioral:  Positive for sleep disturbance.         Taking magnesium L threonate and MMJ  Denies paroxysmal dyspnea.       Medical History Reviewed by provider this encounter:  Tobacco  Allergies  Meds  Problems  Med Hx  Surg Hx  Fam Hx       Annual Wellness Visit Questionnaire   Chen is here for her Subsequent Wellness visit.     Health Risk Assessment:   Patient rates overall health as very good. Patient feels that their physical health rating is same. Patient is very satisfied with their life.  Eyesight was rated as same. Hearing was rated as same. Patient feels that their emotional and mental health rating is same. Patients states they are never, rarely angry. Patient states they are never, rarely unusually tired/fatigued. Pain experienced in the last 7 days has been none. Patient states that she has experienced no weight loss or gain in last 6 months. UTD eye and dental exam    Fall Risk Screening:   In the past year, patient has experienced: no history of falling in past year      Urinary Incontinence Screening:   Patient has not leaked urine accidently in the last six months.     Home Safety:  Patient does not have trouble with stairs inside or outside of their home. Patient has working smoke alarms and has no working carbon monoxide detector. Home safety hazards include: none.     Nutrition:   Current diet is Regular.     Medications:   Patient is currently taking over-the-counter supplements. OTC medications include: see medication list. Patient is able to manage medications.     Activities of Daily Living (ADLs)/Instrumental Activities of Daily Living (IADLs):   Walk and transfer into and out of bed and chair?: Yes  Dress and groom yourself?: Yes    Bathe or shower yourself?: Yes    Feed yourself? Yes  Do your laundry/housekeeping?: Yes  Manage your money, pay your bills and track your expenses?: Yes  Make your own meals?: Yes    Do your own shopping?: Yes    Previous Hospitalizations:   Any hospitalizations or ED visits within the last 12 months?: No      Hospitalization Comments: I did go to the ER for removal of a large splinter but I was not hospitalized and it did not leave me 80 days in the hospital so I chose now even though I did go to the ER probably August 2024    Advance Care Planning:   Living will: No    Durable POA for healthcare: No    Advanced directive: No    Advanced directive counseling given: Yes      Comments: Working on living will as well as POA    PREVENTIVE SCREENINGS       Cardiovascular Screening:    General: History Lipid Disorder and Screening Current      Diabetes Screening:     General: Screening Current      Colorectal Cancer Screening:     General: Screening Current      Breast Cancer Screening:     General: Screening Current    Due for: Mammogram        Cervical Cancer Screening:    General: Risks and Benefits Discussed    Due for: Cervical Pap Smear      Lung Cancer Screening:     General: Screening Not Indicated      Hepatitis C Screening:    General: Screening Current    Screening, Brief Intervention, and Referral to Treatment (SBIRT)    Screening  Typical number of drinks in a day: 0  Typical number of drinks in a week: 0  Interpretation: Low risk drinking behavior.    AUDIT-C Screenin) How often did you have a drink containing alcohol in the past year? monthly or less  2) How many drinks did you have on a typical day when you were drinking in the past year? 1 to 2  3) How often did you have 6 or more drinks on one occasion in the past year? never    AUDIT-C Score: 1  Interpretation: Score 0-2 (female): Negative screen for alcohol misuse    Single Item Drug Screening:  How often have you used an illegal drug (including marijuana) or a prescription medication for non-medical reasons in the past year? never    Single Item Drug Screen Score: 0  Interpretation: Negative screen for possible drug use disorder    Other Counseling Topics:   Car/seat belt/driving safety, skin self-exam, sunscreen and calcium and vitamin D intake and regular weightbearing exercise.     Social Drivers of Health     Food Insecurity: No Food Insecurity (2024)    Hunger Vital Sign     Worried About Running Out of Food in the Last Year: Never true     Ran Out of Food in the Last Year: Never true   Transportation Needs: No Transportation Needs (2024)    PRAPARE - Transportation     Lack of Transportation (Medical): No     Lack of Transportation (Non-Medical): No   Housing Stability:  "Unknown (11/14/2024)    Housing Stability Vital Sign     Unable to Pay for Housing in the Last Year: No     Homeless in the Last Year: No   Utilities: Not At Risk (11/14/2024)    [facility] Utilities     Threatened with loss of utilities: No     No results found.    Objective   /86   Pulse 72   Ht 5' 2\" (1.575 m)   Wt 62.7 kg (138 lb 3.2 oz)   SpO2 98%   BMI 25.28 kg/m²     Physical Exam  Vitals and nursing note reviewed.   Constitutional:       General: She is not in acute distress.     Appearance: She is well-developed.   HENT:      Head: Normocephalic and atraumatic.      Right Ear: Tympanic membrane normal.      Left Ear: Tympanic membrane normal.      Nose: Nose normal.      Mouth/Throat:      Mouth: Mucous membranes are moist.      Pharynx: Oropharynx is clear.   Eyes:      Conjunctiva/sclera: Conjunctivae normal.      Pupils: Pupils are equal, round, and reactive to light.   Cardiovascular:      Rate and Rhythm: Normal rate and regular rhythm.      Pulses: Normal pulses.      Heart sounds: Normal heart sounds. No murmur heard.  Pulmonary:      Effort: Pulmonary effort is normal. No respiratory distress.      Breath sounds: Normal breath sounds.   Abdominal:      Palpations: Abdomen is soft.      Tenderness: There is no abdominal tenderness.   Musculoskeletal:         General: No swelling. Normal range of motion.      Cervical back: Normal range of motion and neck supple.   Skin:     General: Skin is warm and dry.      Capillary Refill: Capillary refill takes less than 2 seconds.      Coloration: Skin is not jaundiced.   Neurological:      General: No focal deficit present.      Mental Status: She is alert and oriented to person, place, and time.      Cranial Nerves: Cranial nerves 2-12 are intact.      Sensory: Sensation is intact.      Motor: Motor function is intact.      Gait: Gait is intact.   Psychiatric:         Attention and Perception: Attention and perception normal.         Mood and Affect: Mood " and affect normal.         Speech: Speech normal.         Behavior: Behavior normal. Behavior is cooperative.         Thought Content: Thought content normal.         Cognition and Memory: Cognition and memory normal.         Judgment: Judgment normal.

## 2024-11-26 LAB — LABORATORY COMMENT REPORT: NORMAL

## 2024-12-18 DIAGNOSIS — R73.03 PREDIABETES: Primary | ICD-10-CM

## 2024-12-21 DIAGNOSIS — R73.03 PREDIABETES: Primary | ICD-10-CM

## 2025-04-18 DIAGNOSIS — E78.2 MIXED HYPERLIPIDEMIA: Primary | ICD-10-CM

## 2025-04-18 NOTE — PROGRESS NOTES
Patient is requesting a prescription for a cardiac CTA for screening.  She is asymptomatic, and understands that this may not be covered by her insurance.

## 2025-05-05 ENCOUNTER — TELEPHONE (OUTPATIENT)
Dept: ADMINISTRATIVE | Facility: HOSPITAL | Age: 70
End: 2025-05-05

## 2025-05-05 NOTE — TELEPHONE ENCOUNTER
CTA [id number], [id number] denied by [facility]- Notes were reviewed and shows that patient does not have heart disease symptoms. Last note from 2023.  Test not medically necessary.     Please call patient to cancel test.

## 2025-05-06 ENCOUNTER — HOSPITAL ENCOUNTER (OUTPATIENT)
Dept: CT IMAGING | Facility: HOSPITAL | Age: 70
Discharge: HOME | End: 2025-05-06
Attending: INTERNAL MEDICINE

## 2025-05-15 ENCOUNTER — HOSPITAL ENCOUNTER (OUTPATIENT)
Dept: CT IMAGING | Facility: HOSPITAL | Age: 70
Discharge: HOME | End: 2025-05-15
Attending: INTERNAL MEDICINE

## 2025-06-06 DIAGNOSIS — E78.2 MIXED HYPERLIPIDEMIA: Primary | ICD-10-CM

## 2025-06-17 ENCOUNTER — HOSPITAL ENCOUNTER (OUTPATIENT)
Dept: CT IMAGING | Facility: HOSPITAL | Age: 70
Discharge: HOME | End: 2025-06-17
Attending: INTERNAL MEDICINE
Payer: COMMERCIAL

## 2025-06-17 DIAGNOSIS — E78.2 MIXED HYPERLIPIDEMIA: ICD-10-CM

## 2025-06-17 PROCEDURE — 75571 CT HRT W/O DYE W/CA TEST: CPT

## 2025-07-03 ENCOUNTER — HOSPITAL ENCOUNTER (OUTPATIENT)
Dept: MAMMOGRAPHY | Facility: CLINIC | Age: 70
Discharge: HOME | End: 2025-07-03
Attending: NURSE PRACTITIONER
Payer: COMMERCIAL

## 2025-07-03 VITALS — HEIGHT: 62 IN | WEIGHT: 138 LBS | BODY MASS INDEX: 25.4 KG/M2

## 2025-07-03 DIAGNOSIS — Z12.31 ENCOUNTER FOR SCREENING MAMMOGRAM FOR BREAST CANCER: ICD-10-CM

## 2025-07-03 PROCEDURE — 77063 BREAST TOMOSYNTHESIS BI: CPT

## 2025-07-03 PROCEDURE — 77067 SCR MAMMO BI INCL CAD: CPT

## 2025-07-14 ENCOUNTER — OFFICE VISIT (OUTPATIENT)
Dept: FAMILY MEDICINE CLINIC | Facility: HOSPITAL | Age: 70
End: 2025-07-14
Payer: COMMERCIAL

## 2025-07-14 ENCOUNTER — HOSPITAL ENCOUNTER (OUTPATIENT)
Dept: RADIOLOGY | Facility: HOSPITAL | Age: 70
Discharge: HOME | End: 2025-07-14
Payer: COMMERCIAL

## 2025-07-14 VITALS
SYSTOLIC BLOOD PRESSURE: 116 MMHG | HEART RATE: 77 BPM | HEIGHT: 61 IN | DIASTOLIC BLOOD PRESSURE: 70 MMHG | OXYGEN SATURATION: 97 % | TEMPERATURE: 97.6 F | BODY MASS INDEX: 23.6 KG/M2 | WEIGHT: 125 LBS

## 2025-07-14 DIAGNOSIS — G89.29 CHRONIC BILATERAL LOW BACK PAIN WITHOUT SCIATICA: Primary | ICD-10-CM

## 2025-07-14 DIAGNOSIS — R19.8 CHANGE IN BOWEL FUNCTION: ICD-10-CM

## 2025-07-14 DIAGNOSIS — M54.50 CHRONIC BILATERAL LOW BACK PAIN WITHOUT SCIATICA: ICD-10-CM

## 2025-07-14 DIAGNOSIS — M54.50 CHRONIC BILATERAL LOW BACK PAIN WITHOUT SCIATICA: Primary | ICD-10-CM

## 2025-07-14 DIAGNOSIS — G89.29 CHRONIC BILATERAL LOW BACK PAIN WITHOUT SCIATICA: ICD-10-CM

## 2025-07-14 PROCEDURE — 99214 OFFICE O/P EST MOD 30 MIN: CPT | Performed by: NURSE PRACTITIONER

## 2025-07-14 PROCEDURE — 72110 X-RAY EXAM L-2 SPINE 4/>VWS: CPT

## 2025-07-14 NOTE — PROGRESS NOTES
"Name: Chen Carrera      : 1955      MRN: [id number]  Encounter Provider: FATOUMATA Day  Encounter Date: 2025   Encounter department: [facility] PRIMARY CARE [address]   :  Assessment & Plan  Chronic bilateral low back pain without sciatica  Evaluate persistent sx's further with xray as ordered and consult PT - consider need for further eval w/MRI if sx's persist/worsen     Orders:    XR spine lumbar minimum 4 views non injury; Future    Ambulatory Referral to Physical Therapy; Future    Change in bowel function  Not likely related to LBP but advise she monitor for persistent/worse sx's - may need further eval w/lumbar MRI vs GI consult           Depression Screening and Follow-up Plan: Patient was screened for depression during today's encounter. They screened negative with a PHQ-2 score of 0.        History of Present Illness       Has had low back pain for 6 weeks. Denies injury or trigger. No meds taken and hasn't applied ice or heat. Hurts to bend and with sitting. Not bothered by activity otherwise. Has had issues w/loose bowels and having to go more often the past 6 weeks. Denies incontinence or saddle paraesthesia. 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   Last colonoscopy in  was normal with 10 year recall.         Review of Systems   Musculoskeletal:  Positive for arthralgias. Negative for gait problem.       Objective   /70 (BP Location: Left arm, Patient Position: Sitting, Cuff Size: Standard)   Pulse 77   Temp 97.6 °F (36.4 °C)   Ht 5' 1\" (1.549 m)   Wt 56.7 kg (125 lb)   SpO2 97%   BMI 23.62 kg/m²          Physical Exam  Vitals reviewed.   Constitutional:       General: She is not in acute distress.     Appearance: Normal appearance.   HENT:      Head: Normocephalic.   Pulmonary:      Effort: Pulmonary effort is normal. No respiratory distress.   Abdominal:      Palpations: Abdomen is soft.      Tenderness: There is " abdominal tenderness (RLQ). There is no guarding or rebound.     Musculoskeletal:      Lumbar back: Tenderness present. No swelling, deformity, spasms or bony tenderness. Decreased range of motion. Negative right straight leg raise test and negative left straight leg raise test.        Back:       Right lower leg: No edema.      Left lower leg: No edema.     Skin:     General: Skin is warm and dry.     Neurological:      General: No focal deficit present.      Mental Status: She is alert and oriented to person, place, and time.      Cranial Nerves: No cranial nerve deficit.      Motor: No weakness.      Gait: Gait normal.     Psychiatric:         Mood and Affect: Mood normal.         Behavior: Behavior normal.         Administrative Statements   I have spent a total time of 20 minutes in caring for this patient on the day of the visit/encounter including Instructions for management, Patient and family education, Impressions, Counseling / Coordination of care, Documenting in the medical record, Reviewing/placing orders in the medical record (including tests, medications, and/or procedures), and Obtaining or reviewing history  .

## 2025-07-18 ENCOUNTER — EVALUATION (OUTPATIENT)
Dept: PHYSICAL THERAPY | Facility: CLINIC | Age: 70
End: 2025-07-18
Attending: NURSE PRACTITIONER
Payer: COMMERCIAL

## 2025-07-18 DIAGNOSIS — G89.29 CHRONIC BILATERAL LOW BACK PAIN WITHOUT SCIATICA: ICD-10-CM

## 2025-07-18 DIAGNOSIS — M54.50 CHRONIC BILATERAL LOW BACK PAIN WITHOUT SCIATICA: ICD-10-CM

## 2025-07-18 PROCEDURE — 97112 NEUROMUSCULAR REEDUCATION: CPT | Performed by: PHYSICAL THERAPIST

## 2025-07-18 PROCEDURE — 97161 PT EVAL LOW COMPLEX 20 MIN: CPT | Performed by: PHYSICAL THERAPIST
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
## 2025-07-18 NOTE — HOME EXERCISE EDUCATION
Program_ID:[id number]   Access Code: [email or web address]  URL: [email or web address]  Date: 07-  Prepared By: Devang Doan    Program Notes      Exercises      - Supine Posterior Pelvic Tilt - 1 x daily - 7 x weekly - 1 sets - 20 reps - 5 hold      - Supine March - 1 x daily - 7 x weekly - 1 sets - 15 reps      - Supine 90/90 Alternating Heel Touches with Posterior Pelvic Tilt - 1 x daily - 7 x weekly - 1 sets - 15 reps      - Supine Bridge - 1 x daily - 7 x weekly - 1 sets - 20 reps - 5 hold      - Alternating Single Leg Bridge - 1 x daily - 7 x weekly - 1 sets - 15 reps - 3 hold      - Supine Lower Trunk Rotation - 1 x daily - 7 x weekly - 1 sets - 10 reps - 10 hold      - Supine Piriformis Stretch with Leg Straight - 1 x daily - 7 x weekly - 1 sets - 10 reps - 10 hold

## 2025-08-01 ENCOUNTER — EVALUATION (OUTPATIENT)
Dept: PHYSICAL THERAPY | Facility: CLINIC | Age: 70
End: 2025-08-01
Attending: NURSE PRACTITIONER
Payer: COMMERCIAL

## 2025-08-01 DIAGNOSIS — M54.50 CHRONIC BILATERAL LOW BACK PAIN WITHOUT SCIATICA: Primary | ICD-10-CM

## 2025-08-01 DIAGNOSIS — G89.29 CHRONIC BILATERAL LOW BACK PAIN WITHOUT SCIATICA: Primary | ICD-10-CM

## 2025-08-01 PROCEDURE — 97110 THERAPEUTIC EXERCISES: CPT | Performed by: PHYSICAL THERAPIST

## 2025-08-01 PROCEDURE — 97112 NEUROMUSCULAR REEDUCATION: CPT | Performed by: PHYSICAL THERAPIST
